# Patient Record
Sex: FEMALE | Race: OTHER | NOT HISPANIC OR LATINO | URBAN - METROPOLITAN AREA
[De-identification: names, ages, dates, MRNs, and addresses within clinical notes are randomized per-mention and may not be internally consistent; named-entity substitution may affect disease eponyms.]

---
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
## 2023-02-02 ENCOUNTER — NON-APPOINTMENT (OUTPATIENT)
Age: 23
End: 2023-02-02

## 2023-02-03 ENCOUNTER — APPOINTMENT (OUTPATIENT)
Dept: OBGYN | Facility: CLINIC | Age: 23
End: 2023-02-03
Payer: COMMERCIAL

## 2023-02-03 VITALS
HEIGHT: 65 IN | BODY MASS INDEX: 18.99 KG/M2 | SYSTOLIC BLOOD PRESSURE: 111 MMHG | DIASTOLIC BLOOD PRESSURE: 77 MMHG | WEIGHT: 114 LBS

## 2023-02-03 PROCEDURE — 99385 PREV VISIT NEW AGE 18-39: CPT

## 2023-02-03 PROCEDURE — 36415 COLL VENOUS BLD VENIPUNCTURE: CPT

## 2023-02-03 NOTE — PLAN
[FreeTextEntry1] : Health Maintenance: 23 year old female pt presents for annual gyn exam\par BSE taught\par Reviewed diet and exercise\par Breast and pelvic exam performed\par Pap smear conducted with Gc/Chl off the pap\par STD bloods drawn\par Advised pt to see PCP annually\par \par Contraception:\par Rx reordered for Vienva \par \par RTO in 1 year or PRN\par

## 2023-02-03 NOTE — PHYSICAL EXAM

## 2023-02-03 NOTE — HISTORY OF PRESENT ILLNESS
[Patient reported PAP Smear was normal] : Patient reported PAP Smear was normal [FreeTextEntry1] : 2023. ELENA JOY 23 year old female  LMP 2023, presents to establish gyn care and offers no complaints.\par \par PMHx: none\par Gyn Hx: chlamydia (treated ). No Hx abnl pap, ovarian cysts, fibroids.\par SHx: denies\par FHx: mother- breast ca in her 50s (? genetic testing)\par All: NKDA\par Soc Hx: last marijuana use 3 months ago, social alcohol use\par \par S/p Gardasil x3.\par \par She reports monthly menses, not too heavy, not painful on Vienva OCP. Pt has been on OCP since 2022 due to painful and heavy menses and associated dizziness. She denies intermenstrual bleeding.\par \par She denies abdominal and pelvic pain. No vaginal discharge or vaginitis symptoms. No urinary complaints. BM is normal per patient.\par \par Pt is sexually active in monogamous relationship. - condoms. Denies sexual dysfunction.\par  [PapSmeardate] : 02/22 [TextBox_31] : done today

## 2023-02-10 LAB
C TRACH RRNA SPEC QL NAA+PROBE: NOT DETECTED
HBV SURFACE AG SER QL: NONREACTIVE
HCV AB SER QL: NONREACTIVE
HCV S/CO RATIO: 0.16 S/CO
HIV1+2 AB SPEC QL IA.RAPID: NONREACTIVE
N GONORRHOEA RRNA SPEC QL NAA+PROBE: NOT DETECTED
SOURCE TP AMPLIFICATION: NORMAL
T PALLIDUM AB SER QL IA: NEGATIVE

## 2023-03-24 ENCOUNTER — APPOINTMENT (OUTPATIENT)
Dept: OBGYN | Facility: CLINIC | Age: 23
End: 2023-03-24
Payer: COMMERCIAL

## 2023-03-24 VITALS
WEIGHT: 110 LBS | BODY MASS INDEX: 18.33 KG/M2 | DIASTOLIC BLOOD PRESSURE: 76 MMHG | SYSTOLIC BLOOD PRESSURE: 116 MMHG | HEART RATE: 84 BPM | HEIGHT: 65 IN

## 2023-03-24 LAB
HCG UR QL: NEGATIVE
QUALITY CONTROL: YES

## 2023-03-24 PROCEDURE — 57454 BX/CURETT OF CERVIX W/SCOPE: CPT

## 2023-03-24 PROCEDURE — 81025 URINE PREGNANCY TEST: CPT

## 2023-03-28 DIAGNOSIS — N76.0 ACUTE VAGINITIS: ICD-10-CM

## 2023-03-28 DIAGNOSIS — B96.89 ACUTE VAGINITIS: ICD-10-CM

## 2023-03-28 LAB
C TRACH RRNA SPEC QL NAA+PROBE: NOT DETECTED
CANDIDA VAG CYTO: NOT DETECTED
G VAGINALIS+PREV SP MTYP VAG QL MICRO: DETECTED
HBV SURFACE AG SER QL: NONREACTIVE
HCV AB SER QL: NONREACTIVE
HCV S/CO RATIO: 0.19 S/CO
HIV1+2 AB SPEC QL IA.RAPID: NONREACTIVE
N GONORRHOEA RRNA SPEC QL NAA+PROBE: NOT DETECTED
SOURCE AMPLIFICATION: NORMAL
T PALLIDUM AB SER QL IA: NEGATIVE
T VAGINALIS VAG QL WET PREP: NOT DETECTED

## 2023-03-28 RX ORDER — METRONIDAZOLE 7.5 MG/G
0.75 GEL VAGINAL
Qty: 1 | Refills: 0 | Status: ACTIVE | COMMUNITY
Start: 2023-03-28 | End: 1900-01-01

## 2023-04-11 ENCOUNTER — NON-APPOINTMENT (OUTPATIENT)
Age: 23
End: 2023-04-11

## 2023-08-03 ENCOUNTER — RESULT CHARGE (OUTPATIENT)
Age: 23
End: 2023-08-03

## 2023-08-03 ENCOUNTER — APPOINTMENT (OUTPATIENT)
Dept: OBGYN | Facility: CLINIC | Age: 23
End: 2023-08-03
Payer: COMMERCIAL

## 2023-08-03 VITALS — SYSTOLIC BLOOD PRESSURE: 115 MMHG | DIASTOLIC BLOOD PRESSURE: 74 MMHG

## 2023-08-03 DIAGNOSIS — Z00.00 ENCOUNTER FOR GENERAL ADULT MEDICAL EXAMINATION W/OUT ABNORMAL FINDINGS: ICD-10-CM

## 2023-08-03 DIAGNOSIS — R85.611 ATYPICAL SQUAMOUS CELLS CANNOT EXCLUDE HIGH GRADE SQUAMOUS INTRAEPITHELIAL LESION ON CYTOLOGIC SMEAR OF ANUS (ASC-H): ICD-10-CM

## 2023-08-03 LAB — HCG UR QL: NEGATIVE

## 2023-08-03 PROCEDURE — 81025 URINE PREGNANCY TEST: CPT

## 2023-08-03 PROCEDURE — ZZZZZ: CPT

## 2023-08-03 PROCEDURE — 57454 BX/CURETT OF CERVIX W/SCOPE: CPT

## 2023-08-03 NOTE — SIGNATURES
[TextEntry] : This note was written by Nick Gomez on 08/03/2023 actively solely ROCHELEL Cain M.D.  All medical record entries made by the Nick were at my, ROCHELLE Cain M.D. direction and personally dictated by me on 08/03/2023. I have personally reviewed the chart and agree that the record reflects my personal performance of the history, physical exam, assessment, and plan.

## 2023-08-03 NOTE — PLAN
[FreeTextEntry1] : Colposcopy / Cervical Biopsy: Multiple areas of acetowhite lesion - from 11 oclock to 1 oclock and 3 oclock to 6 oclock  UCG negative I will call pt in 5-7 days to review results D/w pt normal dark brown spotting that may occur after procedure Light exercise allowed Nothing per vagina including tampons or intercourse for 1 week Pt to call MD if she has heavy vaginal bleeding, fever or chills

## 2023-08-03 NOTE — ASSESSMENT
[FreeTextEntry1] :    08/03/2023. 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  Pt has ASC-H on pap and subsequent colposcopy showed multiple areas of high grade ASHLEY-2  at 6, 9 and 12oclock, however ECC was negative.  Repeat colposcopy and ECC today.

## 2023-11-28 ENCOUNTER — TRANSCRIPTION ENCOUNTER (OUTPATIENT)
Age: 23
End: 2023-11-28

## 2024-04-25 ENCOUNTER — APPOINTMENT (OUTPATIENT)
Dept: OBGYN | Facility: CLINIC | Age: 24
End: 2024-04-25
Payer: COMMERCIAL

## 2024-04-25 VITALS
WEIGHT: 112 LBS | HEIGHT: 65 IN | BODY MASS INDEX: 18.66 KG/M2 | DIASTOLIC BLOOD PRESSURE: 77 MMHG | SYSTOLIC BLOOD PRESSURE: 119 MMHG

## 2024-04-25 DIAGNOSIS — N87.1 MODERATE CERVICAL DYSPLASIA: ICD-10-CM

## 2024-04-25 DIAGNOSIS — Z01.419 ENCOUNTER FOR GYNECOLOGICAL EXAMINATION (GENERAL) (ROUTINE) W/OUT ABNORMAL FINDINGS: ICD-10-CM

## 2024-04-25 DIAGNOSIS — Z11.3 ENCOUNTER FOR SCREENING FOR INFECTIONS WITH A PREDOMINANTLY SEXUAL MODE OF TRANSMISSION: ICD-10-CM

## 2024-04-25 DIAGNOSIS — Z30.09 ENCOUNTER FOR OTHER GENERAL COUNSELING AND ADVICE ON CONTRACEPTION: ICD-10-CM

## 2024-04-25 PROCEDURE — 99459 PELVIC EXAMINATION: CPT

## 2024-04-25 PROCEDURE — 99395 PREV VISIT EST AGE 18-39: CPT

## 2024-04-25 NOTE — HISTORY OF PRESENT ILLNESS
[FreeTextEntry1] : 2024. ELENA JOY 24 year old female  LMP 2024 presents for annual gyn exam.  She feels well and offers no complaints. She reports monthly menses, not too heavy, not painful. She stopped Vienva OCP in (2024). Reports a lower sex drive while on OCP, reports helped her acne and mood changes. She denies intermenstrual bleeding. No vaginal discharge or vaginitis symptoms. No urinary complaints. BM is normal per patient. She denies abdominal and pelvic pain.  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 Had subsequent colposcopy (2023) which showed CIN2 resolved.   She is currently sexually active w/ new male partner w/ the use of condoms. She has had 2 partners within the past 12 months.   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 Denies FHx of ovarian, uterine or colon cancer. Meds: denies Soc: Social alcohol and Marijuana.  Pt is currently in school to become an ultrasound tech.

## 2024-04-25 NOTE — PLAN
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 pap done today.   Contraception Counseling: D/w pt IUD (hormonal and non-hormonal one) and NuvaRing  Pt prefers IUD (Kyleena). R/B/A discussed.  D/w pt 1000mg of Tylenol + 600mg of Advil, Motrin or Ibuprofen morning of IUD insertion.  Pt will schedule IUD insertion.   RTO for Kyleena IUD insertion or PRN for any GYN complaints.

## 2024-05-24 LAB
C TRACH RRNA SPEC QL NAA+PROBE: NOT DETECTED
HBV SURFACE AG SER QL: NONREACTIVE
HCV AB SER QL: NONREACTIVE
HCV S/CO RATIO: 0.14 S/CO
HIV1+2 AB SPEC QL IA.RAPID: NONREACTIVE
HPV HIGH+LOW RISK DNA PNL CVX: NOT DETECTED
N GONORRHOEA RRNA SPEC QL NAA+PROBE: NOT DETECTED
SOURCE TP AMPLIFICATION: NORMAL
T PALLIDUM AB SER QL IA: NEGATIVE

## 2024-06-04 ENCOUNTER — APPOINTMENT (OUTPATIENT)
Dept: OBGYN | Facility: CLINIC | Age: 24
End: 2024-06-04

## 2024-06-09 ENCOUNTER — NON-APPOINTMENT (OUTPATIENT)
Age: 24
End: 2024-06-09

## 2024-06-18 ENCOUNTER — APPOINTMENT (OUTPATIENT)
Dept: OBGYN | Facility: CLINIC | Age: 24
End: 2024-06-18
Payer: COMMERCIAL

## 2024-06-18 VITALS — SYSTOLIC BLOOD PRESSURE: 107 MMHG | DIASTOLIC BLOOD PRESSURE: 71 MMHG

## 2024-06-18 LAB — HCG UR QL: NEGATIVE

## 2024-06-18 PROCEDURE — 81025 URINE PREGNANCY TEST: CPT

## 2024-06-18 PROCEDURE — 57454 BX/CURETT OF CERVIX W/SCOPE: CPT

## 2024-06-18 NOTE — PROCEDURE
[Colposcopy] : Colposcopy  [Time out performed] : Pre-procedure time out performed.  Patient's name, date of birth and procedure confirmed. [Consent Obtained] : Consent obtained [Risks] : risks [Benefits] : benefits [Alternatives] : alternatives [Patient] : patient [Infection] : infection [Bleeding] : bleeding [Allergic Reaction] : allergic reaction [HGSIL] : HGSIL [ECC Performed] : ECC performed [Biopsy] : biopsy taken [Hemostasis Obtained] : Hemostasis obtained [Tolerated Well] : the patient tolerated the procedure well [Colposcopy Adequate] : colposcopy adequate [SCI Fully Visualized] : SCI fully visualized [de-identified] : CIN1 on colp 10/23, CIN2 on colp 3/23 [de-identified] : 5 [de-identified] : 4:00, 5:00, 7:00, 9:00, 12:00 [de-identified] : acetowhite changes seen 12-1:00, 3-5:00, 7-9:00

## 2024-06-18 NOTE — PLAN
[FreeTextEntry1] : Colposcopy / Cervical Biopsy @ 4:00, 5:00, 7:00, 9:00, 12:00, and ECC UCG negative I will call pt in 5-7 days to review results D/w pt normal dark brown spotting that may occur after procedure Light exercise allowed Nothing per vagina including tampons or intercourse for 1 week Pt to call MD if she has heavy vaginal bleeding, fever or chills

## 2024-06-24 ENCOUNTER — TRANSCRIPTION ENCOUNTER (OUTPATIENT)
Age: 24
End: 2024-06-24

## 2024-06-24 RX ORDER — LEVONORGESTREL AND ETHINYL ESTRADIOL 0.1-0.02MG
0.1-2 KIT ORAL DAILY
Qty: 3 | Refills: 3 | Status: ACTIVE | COMMUNITY
Start: 2023-02-03 | End: 1900-01-01

## 2024-07-09 ENCOUNTER — TRANSCRIPTION ENCOUNTER (OUTPATIENT)
Age: 24
End: 2024-07-09

## 2024-07-11 ENCOUNTER — TRANSCRIPTION ENCOUNTER (OUTPATIENT)
Age: 24
End: 2024-07-11

## 2024-07-11 ENCOUNTER — NON-APPOINTMENT (OUTPATIENT)
Age: 24
End: 2024-07-11

## 2025-08-22 ENCOUNTER — TRANSCRIPTION ENCOUNTER (OUTPATIENT)
Age: 25
End: 2025-08-22

## 2025-08-25 ENCOUNTER — TRANSCRIPTION ENCOUNTER (OUTPATIENT)
Age: 25
End: 2025-08-25